# Patient Record
Sex: MALE | Race: AMERICAN INDIAN OR ALASKA NATIVE
[De-identification: names, ages, dates, MRNs, and addresses within clinical notes are randomized per-mention and may not be internally consistent; named-entity substitution may affect disease eponyms.]

---

## 2018-01-09 ENCOUNTER — HOSPITAL ENCOUNTER (OUTPATIENT)
Dept: HOSPITAL 5 - SPVIMAG | Age: 58
Discharge: HOME | End: 2018-01-09
Attending: ORTHOPAEDIC SURGERY
Payer: OTHER GOVERNMENT

## 2018-01-09 DIAGNOSIS — M77.8: ICD-10-CM

## 2018-01-09 DIAGNOSIS — M85.862: ICD-10-CM

## 2018-01-09 DIAGNOSIS — Z96.652: ICD-10-CM

## 2018-01-09 DIAGNOSIS — M85.861: ICD-10-CM

## 2018-01-09 DIAGNOSIS — M17.11: Primary | ICD-10-CM

## 2018-01-09 NOTE — XRAY REPORT
XRAY BILATERAL KNEE FOUR VIEWS EACH: 01/09/18 08:57:00



CLINICAL: Bilateral knee pain.  No comparison.



FINDINGS: 



Right: Mild osteopenia.  The medial and lateral joint spaces are 

normal.  Mild patellofemoral joint osteoarthritis.  A prominent 

quadriceps enthesophyte. No fracture or dislocation.  No joint 

effusion. Normal soft tissues.



Left: Moderate osteopenia.  Status post total joint replacement with 

normal appearance of the prosthesis.  No fracture or dislocation.  No 

joint effusion.  Normal soft tissues.







IMPRESSION: Status post left total knee replacement.  Mild right 

patellofemoral joint osteoarthritis and right quadriceps enthesopathy.